# Patient Record
Sex: MALE | Race: WHITE | NOT HISPANIC OR LATINO | Employment: FULL TIME | URBAN - METROPOLITAN AREA
[De-identification: names, ages, dates, MRNs, and addresses within clinical notes are randomized per-mention and may not be internally consistent; named-entity substitution may affect disease eponyms.]

---

## 2023-04-27 ENCOUNTER — OFFICE VISIT (OUTPATIENT)
Dept: CARDIOLOGY CLINIC | Facility: CLINIC | Age: 63
End: 2023-04-27

## 2023-04-27 VITALS
SYSTOLIC BLOOD PRESSURE: 160 MMHG | RESPIRATION RATE: 20 BRPM | DIASTOLIC BLOOD PRESSURE: 89 MMHG | HEIGHT: 70 IN | WEIGHT: 315 LBS | OXYGEN SATURATION: 98 % | HEART RATE: 80 BPM | BODY MASS INDEX: 45.1 KG/M2

## 2023-04-27 DIAGNOSIS — I51.7 LVH (LEFT VENTRICULAR HYPERTROPHY): ICD-10-CM

## 2023-04-27 DIAGNOSIS — I48.0 PAROXYSMAL ATRIAL FIBRILLATION (HCC): Primary | ICD-10-CM

## 2023-04-27 DIAGNOSIS — I10 PRIMARY HYPERTENSION: ICD-10-CM

## 2023-04-27 DIAGNOSIS — R94.31 ABNORMAL ELECTROCARDIOGRAM (ECG) (EKG): ICD-10-CM

## 2023-04-27 NOTE — PROGRESS NOTES
"[facility]   Office Visit    Yobani Pablo 59 y o  male MRN: [id number]    04/27/23        Physician Requesting Consult:  Reason for Consult / Chief Complaint: Paroxysmal atrial fibrillation  Assessment/Plan:  1    • Paroxysmal atrial fibrillation  2     • Left ventricular hypertrophy  3     • Abnormal EKG  4     • Possible hypertension  5    •     He is a chads vasc score of 0, 1 if he has hypertension  Recommend resuming asa 81 mg a day  We will check a zio monitor to assess for afib  Check echo for lvh  Follow Bps at home  Goal <130/80  HPI: Yobani Pablo is a 59y o  year old male who presents with PAF  He states 10 years ago he had paf incidentally found, was asymptomatic  HE was treated with asa which he stopped 4-5 months ago  He now feels well  He has no h/o htn but is hypertensive here today  Lipids 8/11/21:    Ref Range & Units 1 yr ago Comments   Cholesterol <200 mg/dL 185     Triglyceride <150 mg/dL 94     Cholesterol, HDL, Direct >40 mg/dL 45     Cholesterol, Non-HDL <160 mg/dL 140  Note: For NCEP interpretive guidelines please refer to the Laboratory Handbook  Cholesterol, LDL, Calculated <130 mg/dL 121  LDL Cholesterol was calculated        Family History: Dad had 2 heart stents in 46s  No afib   Tobacco:Never  Alcohol:Ocassional wine      Review of Systems:  Review of SystemsNegative    PHYSICAL EXAM:  Vitals:   Vitals:    04/27/23 0923   BP: 160/89   BP Location: Left arm   Patient Position: Sitting   Cuff Size: Standard   Pulse: 80   Resp: 20   SpO2: 98%   Weight: (!) 159 kg (350 lb)   Height: 5' 10\" (1 778 m)        Physical Exam:  Physical Exam GEN: Alert and oriented x 3, in no acute distress  Well appearing and well nourished  HEENT: Sclera anicteric, conjunctivae pink, mucous membranes moist  Oropharynx clear  NECK: Supple, no carotid bruits, no significant JVD  Trachea midline, no thyromegaly     HEART: Regular rhythm, normal S1 and S2, no " murmurs, clicks, gallops or rubs  PMI nondisplaced, no thrills  LUNGS: Clear to auscultation bilaterally; no wheezes, rales, or rhonchi  No increased work of breathing or signs of respiratory distress  ABDOMEN: Soft, nontender, nondistended, normoactive bowel sounds  EXTREMITIES: Skin warm and well perfused, no clubbing, cyanosis, or edema  NEURO: No focal findings  Normal speech  Mood and affect normal    SKIN: Normal without suspicious lesions on exposed skin  Follow up: 6 months or sooner as needed    No Known Allergies    No current outpatient medications on file  History reviewed  No pertinent past medical history  History reviewed  No pertinent family history  History reviewed  No pertinent past medical history  History reviewed  No pertinent surgical history      Social History     Socioeconomic History   • Marital status: /Civil Union     Spouse name: Not on file   • Number of children: Not on file   • Years of education: Not on file   • Highest education level: Not on file   Occupational History   • Not on file   Tobacco Use   • Smoking status: Never   • Smokeless tobacco: Never   Substance and Sexual Activity   • Alcohol use: Yes     Comment: ocassionally   • Drug use: Never   • Sexual activity: Yes     Partners: Female   Other Topics Concern   • Not on file   Social History Narrative   • Not on file     Social Determinants of Health     Financial Resource Strain: Not on file   Food Insecurity: Not on file   Transportation Needs: Not on file   Physical Activity: Not on file   Stress: Not on file   Social Connections: Not on file   Intimate Partner Violence: Not on file   Housing Stability: Not on file             LABORATORY RESULTS:    No results found for: WBC, HGB, HCT, MCV, PLT  No results found for: GLUCOSE, CALCIUM, NA, K, CO2, CL, BUN, CREATININE  Lab Results   Component Value Date    HGBA1C 6 0 (H) 08/11/2021       Lipid Profile:   No results found for: CHOL  No results found for: HDL  No results found for: LDLCALC  No results found for: TRIG    The ASCVD Risk score (Barak DK, et al , 2019) failed to calculate for the following reasons:    Cannot find a previous HDL lab    Cannot find a previous total cholesterol lab    1  Paroxysmal atrial fibrillation (HCC)        2  LVH (left ventricular hypertrophy)        3  Abnormal electrocardiogram (ECG) (EKG)        4  Possible hypertension  Imaging: I have personally reviewed pertinent reports          EKG reviewed personally: NSR, LVH      Edinson Elias MD  4/27/2023,9:42 AM

## 2023-05-19 ENCOUNTER — HOSPITAL ENCOUNTER (OUTPATIENT)
Dept: NON INVASIVE DIAGNOSTICS | Facility: CLINIC | Age: 63
Discharge: HOME | End: 2023-05-19

## 2023-05-19 ENCOUNTER — CLINICAL SUPPORT (OUTPATIENT)
Dept: CARDIOLOGY CLINIC | Facility: CLINIC | Age: 63
End: 2023-05-19

## 2023-05-19 VITALS
SYSTOLIC BLOOD PRESSURE: 160 MMHG | WEIGHT: 315 LBS | HEIGHT: 70 IN | HEART RATE: 65 BPM | DIASTOLIC BLOOD PRESSURE: 89 MMHG | BODY MASS INDEX: 45.1 KG/M2

## 2023-05-19 DIAGNOSIS — I51.7 LVH (LEFT VENTRICULAR HYPERTROPHY): ICD-10-CM

## 2023-05-19 DIAGNOSIS — I10 PRIMARY HYPERTENSION: ICD-10-CM

## 2023-05-19 DIAGNOSIS — I48.0 PAROXYSMAL ATRIAL FIBRILLATION (HCC): ICD-10-CM

## 2023-05-19 DIAGNOSIS — R94.31 ABNORMAL ELECTROCARDIOGRAM (ECG) (EKG): ICD-10-CM

## 2023-05-19 LAB
AORTIC ROOT: 3.8 CM
APICAL FOUR CHAMBER EJECTION FRACTION: 66 %
ASCENDING AORTA: 3.5 CM
E WAVE DECELERATION TIME: 256 MS
FRACTIONAL SHORTENING: 36 % (ref 28–44)
INTERVENTRICULAR SEPTUM IN DIASTOLE (PARASTERNAL SHORT AXIS VIEW): 1.3 CM
INTERVENTRICULAR SEPTUM: 1.3 CM (ref 0.6–1.1)
LAAS-AP2: 14.5 CM2
LAAS-AP4: 20.7 CM2
LEFT ATRIUM SIZE: 4 CM
LEFT INTERNAL DIMENSION IN SYSTOLE: 3.4 CM (ref 2.1–4)
LEFT VENTRICLE DIASTOLIC VOLUME (MOD BIPLANE): 77 ML
LEFT VENTRICLE SYSTOLIC VOLUME (MOD BIPLANE): 25 ML
LEFT VENTRICULAR INTERNAL DIMENSION IN DIASTOLE: 5.3 CM (ref 3.5–6)
LEFT VENTRICULAR POSTERIOR WALL IN END DIASTOLE: 1.3 CM
LEFT VENTRICULAR STROKE VOLUME: 88 ML
LV EF: 67 %
LVSV (TEICH): 88 ML
MV E'TISSUE VEL-SEP: 9 CM/S
MV PEAK A VEL: 0.89 M/S
MV PEAK E VEL: 82 CM/S
MV STENOSIS PRESSURE HALF TIME: 74 MS
MV VALVE AREA P 1/2 METHOD: 2.97 CM2
RIGHT ATRIUM AREA SYSTOLE A4C: 15.6 CM2
RIGHT VENTRICLE ID DIMENSION: 2.9 CM
SL CV LEFT ATRIUM LENGTH A2C: 5.2 CM
SL CV LV EF: 60
SL CV PED ECHO LEFT VENTRICLE DIASTOLIC VOLUME (MOD BIPLANE) 2D: 134 ML
SL CV PED ECHO LEFT VENTRICLE SYSTOLIC VOLUME (MOD BIPLANE) 2D: 46 ML
TR MAX PG: 23 MMHG
TR PEAK VELOCITY: 2.4 M/S
TRICUSPID ANNULAR PLANE SYSTOLIC EXCURSION: 2.2 CM
TRICUSPID VALVE PEAK REGURGITATION VELOCITY: 2.39 M/S

## 2023-05-21 NOTE — RESULT ENCOUNTER NOTE
[facility]    Event Recorder Results    Indication: paroxysmal atrial fibrillation    Duration of monitorin days 21 hours    Results:   Patient had a min HR of 42 bpm, max HR of 169 bpm, and avg HR of 73 bpm    Predominant underlying rhythm was Sinus Rhythm  1217 Supraventricular Tachycardia runs occurred, the longest lasting 30 4 secs   Isolated SVEs were occasional (2 5%, 33286)- likely atrial fibrillation  Isolated VEs were occasional (1 4%, 52091), VE Couplets were rare (<1 0%, 311)  Ventricular Bigeminy and Trigeminy were present  Interpretation:  Predominantly normal sinus rhythm   Frequent episodes of paroxysmal atrial fibrillation noted; the longest run was 30 4 seconds     Avg HR of 73 bpm    Accurate interpretation limited by baseline artifact

## 2023-05-22 ENCOUNTER — TELEPHONE (OUTPATIENT)
Dept: CARDIOLOGY CLINIC | Facility: CLINIC | Age: 63
End: 2023-05-22

## 2023-05-22 NOTE — TELEPHONE ENCOUNTER
----- Message from Jeanna Cervantes MD sent at 5/22/2023  8:23 AM EDT -----  Please laura pt: Monitor reveals some paf, continue asa  His echo shows mild lv weakness at tip of the heart   Given this I would like to check an exercise nuclear stress test to assess for cad    ----- Message -----  From: Oracio Ortiz MD  Sent: 5/19/2023   1:42 PM EDT  To: Jeanna Cervantes MD

## 2023-05-23 NOTE — TELEPHONE ENCOUNTER
Patient is trying to schedule stress test and there is no script in system  Please advise at [phone]

## 2023-05-24 DIAGNOSIS — I48.0 PAF (PAROXYSMAL ATRIAL FIBRILLATION) (HCC): Primary | ICD-10-CM

## 2023-05-24 DIAGNOSIS — I10 PRIMARY HYPERTENSION: ICD-10-CM

## 2023-05-24 NOTE — TELEPHONE ENCOUNTER
Spoke to pt  Advised order for stress echo placed in chart  Pt advised that he is able to walk on a treadmill

## 2023-05-24 NOTE — TELEPHONE ENCOUNTER
Spoke with patient told patient that were waiting for the order to be placed in his chart and someone will reach out to him once we get a response, patient verbally understood

## 2023-06-23 ENCOUNTER — HOSPITAL ENCOUNTER (OUTPATIENT)
Dept: NON INVASIVE DIAGNOSTICS | Facility: CLINIC | Age: 63
Discharge: HOME | End: 2023-06-23
Payer: COMMERCIAL

## 2023-06-23 VITALS
SYSTOLIC BLOOD PRESSURE: 144 MMHG | HEIGHT: 70 IN | OXYGEN SATURATION: 98 % | BODY MASS INDEX: 45.1 KG/M2 | WEIGHT: 315 LBS | HEART RATE: 72 BPM | DIASTOLIC BLOOD PRESSURE: 96 MMHG

## 2023-06-23 DIAGNOSIS — I48.0 PAF (PAROXYSMAL ATRIAL FIBRILLATION) (HCC): ICD-10-CM

## 2023-06-23 DIAGNOSIS — I10 PRIMARY HYPERTENSION: ICD-10-CM

## 2023-06-23 LAB
CHEST PAIN STATEMENT: NORMAL
MAX DIASTOLIC BP: 86 MMHG
MAX HEART RATE: 164 BPM
MAX HR PERCENT: 90 %
MAX HR: 142 BPM
MAX PREDICTED HEART RATE: 158 BPM
MAX. SYSTOLIC BP: 182 MMHG
PROTOCOL NAME: NORMAL
RATE PRESSURE PRODUCT: NORMAL
SL CV STRESS RECOVERY BP: NORMAL MMHG
SL CV STRESS RECOVERY HR: 79 BPM
SL CV STRESS RECOVERY O2 SAT: 98 %
SL CV STRESS STAGE REACHED: 2
STRESS ANGINA INDEX: 0
STRESS BASELINE BP: NORMAL MMHG
STRESS BASELINE HR: 72 BPM
STRESS O2 SAT REST: 98 %
STRESS PEAK HR: 142 BPM
STRESS POST ESTIMATED WORKLOAD: 7 METS
STRESS POST EXERCISE DUR MIN: 10 MIN
STRESS POST EXERCISE DUR SEC: 30 SEC
STRESS POST O2 SAT PEAK: 94 %
STRESS POST PEAK BP: 182 MMHG
TARGET HR FORMULA: NORMAL
TEST INDICATION: NORMAL
TIME IN EXERCISE PHASE: NORMAL

## 2023-06-23 PROCEDURE — 93350 STRESS TTE ONLY: CPT | Performed by: INTERNAL MEDICINE

## 2023-06-23 PROCEDURE — 93350 STRESS TTE ONLY: CPT

## 2023-06-27 ENCOUNTER — TELEPHONE (OUTPATIENT)
Dept: CARDIOLOGY CLINIC | Facility: CLINIC | Age: 63
End: 2023-06-27

## 2023-06-27 DIAGNOSIS — I48.0 PAROXYSMAL ATRIAL FIBRILLATION (HCC): Primary | ICD-10-CM

## 2023-06-27 DIAGNOSIS — I10 PRIMARY HYPERTENSION: ICD-10-CM

## 2023-06-27 DIAGNOSIS — R94.31 ABNORMAL ELECTROCARDIOGRAM (ECG) (EKG): ICD-10-CM

## 2023-06-27 DIAGNOSIS — R06.02 SHORTNESS OF BREATH: ICD-10-CM

## 2023-06-27 LAB
CHEST PAIN STATEMENT: NORMAL
MAX DIASTOLIC BP: 86 MMHG
MAX HEART RATE: 164 BPM
MAX PREDICTED HEART RATE: 158 BPM
MAX. SYSTOLIC BP: 182 MMHG
PROTOCOL NAME: NORMAL
TARGET HR FORMULA: NORMAL
TEST INDICATION: NORMAL
TIME IN EXERCISE PHASE: NORMAL

## 2023-06-27 NOTE — TELEPHONE ENCOUNTER
Spoke with pt  Patient verbally understood that his Echo stress test, exercise was non diagnostic and another test will be ordered  Message was sent Please place an order for an exercise nuclear stress test  As per Dr Ravi Wilhelm  Thanks!

## 2023-06-27 NOTE — TELEPHONE ENCOUNTER
----- Message from Jenae Thorpe MD sent at 6/27/2023  1:03 PM EDT -----  Please call the patient and tell him the stress test was nondiagnostic    Please order an exercise nuclear stress test   ----- Message -----  From: Alma Jeffery PA-C  Sent: 6/23/2023   3:15 PM EDT  To: Jenae Thorpe MD    You want a nuclear stress tset I assume  ----- Message -----  From: Harshad Stanley MD  Sent: 6/23/2023   2:28 PM EDT  To: Alma Jeffery PA-C

## 2023-06-28 DIAGNOSIS — I48.0 PAF (PAROXYSMAL ATRIAL FIBRILLATION) (HCC): Primary | ICD-10-CM

## 2023-06-28 DIAGNOSIS — R94.31 ABNORMAL EKG: ICD-10-CM

## 2023-06-29 DIAGNOSIS — R94.31 ABNORMAL EKG: ICD-10-CM

## 2023-06-29 DIAGNOSIS — I48.0 PAF (PAROXYSMAL ATRIAL FIBRILLATION) (HCC): Primary | ICD-10-CM

## 2023-07-27 ENCOUNTER — HOSPITAL ENCOUNTER (OUTPATIENT)
Dept: NON INVASIVE DIAGNOSTICS | Facility: CLINIC | Age: 63
Discharge: HOME | End: 2023-07-27
Payer: COMMERCIAL

## 2023-07-27 VITALS
OXYGEN SATURATION: 97 % | HEIGHT: 70 IN | DIASTOLIC BLOOD PRESSURE: 96 MMHG | BODY MASS INDEX: 45.1 KG/M2 | HEART RATE: 71 BPM | WEIGHT: 315 LBS | SYSTOLIC BLOOD PRESSURE: 144 MMHG

## 2023-07-27 DIAGNOSIS — I48.0 PAF (PAROXYSMAL ATRIAL FIBRILLATION) (HCC): ICD-10-CM

## 2023-07-27 DIAGNOSIS — R94.31 ABNORMAL EKG: ICD-10-CM

## 2023-07-27 LAB
CHEST PAIN STATEMENT: NORMAL
MAX DIASTOLIC BP: 92 MMHG
MAX HEART RATE: 137 BPM
MAX HR PERCENT: 87 %
MAX HR: 137 BPM
MAX PREDICTED HEART RATE: 158 BPM
MAX. SYSTOLIC BP: 192 MMHG
NUC STRESS EJECTION FRACTION: 50 %
PROTOCOL NAME: NORMAL
RATE PRESSURE PRODUCT: NORMAL
REASON FOR TERMINATION: NORMAL
SL CV REST NUCLEAR ISOTOPE DOSE: 16.2 MCI
SL CV STRESS NUCLEAR ISOTOPE DOSE: 48.6 MCI
SL CV STRESS RECOVERY BP: NORMAL MMHG
SL CV STRESS RECOVERY HR: 73 BPM
SL CV STRESS RECOVERY O2 SAT: 97 %
SL CV STRESS STAGE REACHED: 1
STRESS ANGINA INDEX: 0
STRESS BASELINE BP: NORMAL MMHG
STRESS BASELINE HR: 71 BPM
STRESS O2 SAT REST: 97 %
STRESS PEAK HR: 192 BPM
STRESS POST ESTIMATED WORKLOAD: 4.6 METS
STRESS POST EXERCISE DUR MIN: 7 MIN
STRESS POST O2 SAT PEAK: 96 %
STRESS POST PEAK BP: 137 MMHG
TARGET HR FORMULA: NORMAL
TEST INDICATION: NORMAL
TIME IN EXERCISE PHASE: NORMAL

## 2023-07-27 PROCEDURE — G1004 CDSM NDSC: HCPCS

## 2023-07-27 PROCEDURE — 93016 CV STRESS TEST SUPVJ ONLY: CPT | Performed by: INTERNAL MEDICINE

## 2023-07-27 PROCEDURE — 78452 HT MUSCLE IMAGE SPECT MULT: CPT | Performed by: INTERNAL MEDICINE

## 2023-07-27 PROCEDURE — 93017 CV STRESS TEST TRACING ONLY: CPT

## 2023-07-27 PROCEDURE — 93018 CV STRESS TEST I&R ONLY: CPT | Performed by: INTERNAL MEDICINE

## 2023-07-27 PROCEDURE — 78452 HT MUSCLE IMAGE SPECT MULT: CPT

## 2023-07-27 PROCEDURE — A9502 TC99M TETROFOSMIN: HCPCS

## 2023-07-28 ENCOUNTER — TELEPHONE (OUTPATIENT)
Dept: CARDIOLOGY CLINIC | Facility: CLINIC | Age: 63
End: 2023-07-28

## 2023-07-28 NOTE — TELEPHONE ENCOUNTER
----- Message from Karla Lomax PA-C sent at 7/27/2023  5:02 PM EDT -----  Please call patient to advise that stress test overall looks okay, Thank you!  ----- Message -----  From: Eh Farooq MD  Sent: 7/27/2023   2:57 PM EDT  To: Karla Lomax PA-C

## 2024-01-03 ENCOUNTER — TELEPHONE (OUTPATIENT)
Dept: CARDIOLOGY CLINIC | Facility: CLINIC | Age: 64
End: 2024-01-03

## 2024-01-03 NOTE — TELEPHONE ENCOUNTER
Patient would like to know what does Dr. Turner think of him taking a statin.       PT can be reached at [phone]

## 2024-01-04 NOTE — TELEPHONE ENCOUNTER
Spoke with patient relayed  response, patient verbally understood.    Pt requested  response also to be sent through his ROSTRt.

## 2024-08-05 ENCOUNTER — OFFICE VISIT (OUTPATIENT)
Dept: CARDIOLOGY CLINIC | Facility: CLINIC | Age: 64
End: 2024-08-05
Payer: COMMERCIAL

## 2024-08-05 VITALS
DIASTOLIC BLOOD PRESSURE: 76 MMHG | HEIGHT: 70 IN | SYSTOLIC BLOOD PRESSURE: 130 MMHG | HEART RATE: 75 BPM | WEIGHT: 315 LBS | BODY MASS INDEX: 45.1 KG/M2 | RESPIRATION RATE: 16 BRPM | OXYGEN SATURATION: 96 %

## 2024-08-05 DIAGNOSIS — I48.0 PAROXYSMAL ATRIAL FIBRILLATION (HCC): ICD-10-CM

## 2024-08-05 DIAGNOSIS — I48.0 PAROXYSMAL ATRIAL FIBRILLATION (HCC): Primary | ICD-10-CM

## 2024-08-05 PROCEDURE — 99214 OFFICE O/P EST MOD 30 MIN: CPT | Performed by: INTERNAL MEDICINE

## 2024-08-05 RX ORDER — SEMAGLUTIDE 0.68 MG/ML
0.5 INJECTION, SOLUTION SUBCUTANEOUS
COMMUNITY

## 2024-08-05 RX ORDER — ATORVASTATIN CALCIUM 20 MG/1
20 TABLET, FILM COATED ORAL DAILY
COMMUNITY
Start: 2023-12-14 | End: 2024-12-13

## 2024-08-05 RX ORDER — LISINOPRIL 10 MG/1
10 TABLET ORAL DAILY
Qty: 90 TABLET | Refills: 3 | Status: SHIPPED | OUTPATIENT
Start: 2024-08-05

## 2024-08-05 RX ORDER — ASPIRIN 81 MG/1
81 TABLET ORAL DAILY
COMMUNITY
End: 2024-08-05
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
## 2024-08-05 NOTE — TELEPHONE ENCOUNTER
Reason for call: patient needs 90 day supply for mail order  [x] Refill   [] Prior Auth  [] Other:     Office:   [] PCP/Provider -   [x] Specialty/Provider - cardio    Medication:       Does the patient have enough for 3 days?   [] Yes   [x] No - Send as HP to POD

## 2025-01-07 ENCOUNTER — TELEPHONE (OUTPATIENT)
Age: 65
End: 2025-01-07

## 2025-01-07 DIAGNOSIS — I48.0 PAROXYSMAL ATRIAL FIBRILLATION (HCC): ICD-10-CM

## 2025-01-07 NOTE — TELEPHONE ENCOUNTER
Last ov 8/5/24    Reason for call:   [x] Refill   [] Prior Auth  [] Other:     Office:   [] PCP/Provider -   [x] Specialty/Provider - Cardiology    Medication:  apixaban (Eliquis) 5 mg    Dose/Frequency: Take 1 tablet (5 mg total) by mouth 2 (two) times a day,     Quantity: 180    Pharmacy: [facility] - [address]      Does the patient have enough for 3 days?   [x] Yes   [] No - Send as HP to POD

## 2025-01-07 NOTE — TELEPHONE ENCOUNTER
Caller: Young Balderas    Doctor: Dr. Meléndez    Call back #: [phone]    Reason for call: Patient called to make an appointment with Dr. Meléndez for a follow up appointment. Patient wanted to know if he needs any blood work or cardiac testing prior to his appointment on 3/4/25? Please call patient to let him know if any is needed. Thank you!

## 2025-03-03 ENCOUNTER — TELEPHONE (OUTPATIENT)
Age: 65
End: 2025-03-03

## 2025-03-03 NOTE — TELEPHONE ENCOUNTER
Caller: Young     Doctor: Dr. Meléndez     Reason for call: patient would like to to know if Dr. Meléndez would like him to get blood work completed before appt tomorrow? Please advise.    Call back#: [phone]

## 2025-03-03 NOTE — TELEPHONE ENCOUNTER
Patient calling back because he missed a call from the officw, can you please dial him again? If no answer PLEASE leave a detailed message with a clear response to relay back to pt  - as we are having technical issues today at the access center and cannot get through to any outside call/transfers including office staff. Thank you!!!!

## 2025-03-04 ENCOUNTER — OFFICE VISIT (OUTPATIENT)
Dept: CARDIOLOGY CLINIC | Facility: CLINIC | Age: 65
End: 2025-03-04
Payer: COMMERCIAL

## 2025-03-04 VITALS
WEIGHT: 315 LBS | RESPIRATION RATE: 16 BRPM | HEIGHT: 70 IN | OXYGEN SATURATION: 96 % | BODY MASS INDEX: 45.1 KG/M2 | HEART RATE: 71 BPM

## 2025-03-04 DIAGNOSIS — R94.31 ABNORMAL ELECTROCARDIOGRAM (ECG) (EKG): ICD-10-CM

## 2025-03-04 DIAGNOSIS — I48.0 PAROXYSMAL ATRIAL FIBRILLATION (HCC): Primary | ICD-10-CM

## 2025-03-04 DIAGNOSIS — I51.7 LVH (LEFT VENTRICULAR HYPERTROPHY): ICD-10-CM

## 2025-03-04 PROCEDURE — 99214 OFFICE O/P EST MOD 30 MIN: CPT | Performed by: INTERNAL MEDICINE
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
## 2025-07-14 DIAGNOSIS — I48.0 PAROXYSMAL ATRIAL FIBRILLATION (HCC): ICD-10-CM

## 2025-07-15 RX ORDER — LISINOPRIL 10 MG/1
10 TABLET ORAL DAILY
Qty: 90 TABLET | Refills: 1 | Status: SHIPPED | OUTPATIENT
Start: 2025-07-15

## 2025-07-30 DIAGNOSIS — I48.0 PAROXYSMAL ATRIAL FIBRILLATION (HCC): ICD-10-CM

## 2025-07-31 RX ORDER — APIXABAN 5 MG/1
5 TABLET, FILM COATED ORAL 2 TIMES DAILY
Qty: 180 TABLET | Refills: 1 | Status: SHIPPED | OUTPATIENT
Start: 2025-07-31

## 2025-08-06 ENCOUNTER — TELEPHONE (OUTPATIENT)
Age: 65
End: 2025-08-06